# Patient Record
Sex: FEMALE | Race: WHITE | URBAN - METROPOLITAN AREA
[De-identification: names, ages, dates, MRNs, and addresses within clinical notes are randomized per-mention and may not be internally consistent; named-entity substitution may affect disease eponyms.]

---

## 2017-02-27 ENCOUNTER — TELEPHONE (OUTPATIENT)
Dept: FAMILY MEDICINE CLINIC | Facility: CLINIC | Age: 82
End: 2017-02-27

## 2017-02-27 RX ORDER — CEPHALEXIN 250 MG/1
1 CAPSULE ORAL DAILY
COMMUNITY
Start: 2016-08-10 | End: 2017-06-07

## 2017-02-27 RX ORDER — ALBUTEROL SULFATE 90 UG/1
2 AEROSOL, METERED RESPIRATORY (INHALATION) 4 TIMES DAILY PRN
COMMUNITY
Start: 2013-07-08 | End: 2018-12-28

## 2017-02-27 RX ORDER — LORAZEPAM 1 MG/1
1.5 TABLET ORAL 2 TIMES DAILY
Qty: 60 TABLET | Refills: 2 | Status: SHIPPED
Start: 2017-02-27 | End: 2017-02-27

## 2017-02-27 RX ORDER — LORAZEPAM 1 MG/1
TABLET ORAL
Qty: 60 TABLET | Refills: 2 | Status: SHIPPED
Start: 2017-02-27 | End: 2017-06-05

## 2017-02-27 RX ORDER — LORAZEPAM 1 MG/1
1.5 TABLET ORAL 2 TIMES DAILY
COMMUNITY
Start: 2011-04-28 | End: 2017-02-27

## 2017-02-27 RX ORDER — LISINOPRIL 10 MG/1
TABLET ORAL
COMMUNITY
Start: 2016-12-21 | End: 2017-06-07

## 2017-02-27 RX ORDER — OXYBUTYNIN CHLORIDE 5 MG/1
5 TABLET ORAL DAILY
COMMUNITY
Start: 2015-08-10

## 2017-02-27 RX ORDER — ATENOLOL 50 MG/1
1 TABLET ORAL 2 TIMES DAILY
COMMUNITY
Start: 2012-11-28 | End: 2017-08-16

## 2017-02-27 RX ORDER — SERTRALINE HYDROCHLORIDE 100 MG/1
0.5 TABLET, FILM COATED ORAL DAILY
COMMUNITY
Start: 2007-07-07 | End: 2017-03-06

## 2017-02-27 RX ORDER — ACETAMINOPHEN AND CODEINE PHOSPHATE 300; 30 MG/1; MG/1
TABLET ORAL
COMMUNITY
Start: 2016-12-21 | End: 2018-06-08 | Stop reason: ALTCHOICE

## 2017-02-27 RX ORDER — FERROUS SULFATE 325(65) MG
1 TABLET ORAL DAILY
COMMUNITY
Start: 2016-12-21 | End: 2018-06-08 | Stop reason: CLARIF

## 2017-02-27 RX ORDER — OMEPRAZOLE 20 MG/1
20 CAPSULE, DELAYED RELEASE ORAL DAILY
COMMUNITY
Start: 2007-03-14 | End: 2017-06-07

## 2017-02-27 RX ORDER — ATORVASTATIN CALCIUM 10 MG/1
10 TABLET, FILM COATED ORAL DAILY
COMMUNITY
Start: 2006-11-01 | End: 2017-10-15

## 2017-02-28 NOTE — TELEPHONE ENCOUNTER
Received c/b from patient stating she would like her scripts permanently sent to [facility], [place].  Irasema Brown, 02/28/2017, 2:06 PM

## 2017-03-03 ENCOUNTER — TELEPHONE (OUTPATIENT)
Dept: FAMILY MEDICINE CLINIC | Facility: CLINIC | Age: 82
End: 2017-03-03

## 2017-03-06 RX ORDER — SERTRALINE HYDROCHLORIDE 100 MG/1
50 TABLET, FILM COATED ORAL DAILY
Qty: 45 TABLET | Refills: 0 | Status: SHIPPED | OUTPATIENT
Start: 2017-03-06 | End: 2017-05-31

## 2017-03-06 NOTE — TELEPHONE ENCOUNTER
Pt looking for refill for Zoloft.  [facility]

## 2017-04-21 RX ORDER — NIFEDIPINE 60 MG/1
60 TABLET, FILM COATED, EXTENDED RELEASE ORAL DAILY
Qty: 90 TABLET | Refills: 0 | Status: CANCELLED | OUTPATIENT
Start: 2017-04-21

## 2017-04-21 RX ORDER — NIFEDIPINE 60 MG/1
TABLET, EXTENDED RELEASE ORAL
Qty: 90 TABLET | Refills: 1 | Status: SHIPPED | OUTPATIENT
Start: 2017-04-21 | End: 2017-10-15

## 2017-05-15 RX ORDER — LEVOTHYROXINE SODIUM 88 UG/1
TABLET ORAL
Qty: 90 TABLET | Refills: 0 | Status: SHIPPED | OUTPATIENT
Start: 2017-05-15 | End: 2017-08-16

## 2017-05-16 ENCOUNTER — TELEPHONE (OUTPATIENT)
Dept: FAMILY MEDICINE CLINIC | Facility: CLINIC | Age: 82
End: 2017-05-16

## 2017-05-31 RX ORDER — SERTRALINE HYDROCHLORIDE 100 MG/1
TABLET, FILM COATED ORAL
Qty: 45 TABLET | Refills: 0 | Status: SHIPPED | OUTPATIENT
Start: 2017-05-31 | End: 2017-06-07

## 2017-06-05 RX ORDER — LORAZEPAM 1 MG/1
1.5 TABLET ORAL 2 TIMES DAILY
Qty: 90 TABLET | Refills: 0 | Status: SHIPPED
Start: 2017-06-05 | End: 2017-07-17

## 2017-06-05 NOTE — TELEPHONE ENCOUNTER
The last RX that was sent in was only for 2 days. Dr Thony Pacheco can you please reorder medication for 30 days with refills. Thank you.

## 2017-06-05 NOTE — TELEPHONE ENCOUNTER
Future Appointments  Date Time Provider Salvatore Meléndez   6/7/2017 2:30 PM Luis Hogan MD [facility] [place]     Set up appt

## 2017-06-05 NOTE — TELEPHONE ENCOUNTER
Pharmacy called because the Alprazolam order as written only allows for a 20 day supply. Can you please resend the order as a 30 day supply?

## 2017-06-07 ENCOUNTER — OFFICE VISIT (OUTPATIENT)
Dept: FAMILY MEDICINE CLINIC | Facility: CLINIC | Age: 82
End: 2017-06-07

## 2017-06-07 VITALS
TEMPERATURE: 98 F | SYSTOLIC BLOOD PRESSURE: 148 MMHG | WEIGHT: 138 LBS | BODY MASS INDEX: 24.45 KG/M2 | DIASTOLIC BLOOD PRESSURE: 74 MMHG | HEIGHT: 63 IN | HEART RATE: 78 BPM | RESPIRATION RATE: 20 BRPM

## 2017-06-07 DIAGNOSIS — I10 ESSENTIAL HYPERTENSION: ICD-10-CM

## 2017-06-07 DIAGNOSIS — J44.9 CHRONIC OBSTRUCTIVE PULMONARY DISEASE, UNSPECIFIED COPD TYPE (HCC): ICD-10-CM

## 2017-06-07 DIAGNOSIS — Z00.00 HEALTH CARE MAINTENANCE: Primary | ICD-10-CM

## 2017-06-07 DIAGNOSIS — F17.200 TOBACCO USE DISORDER: ICD-10-CM

## 2017-06-07 DIAGNOSIS — M19.90 OSTEOARTHRITIS, UNSPECIFIED OSTEOARTHRITIS TYPE, UNSPECIFIED SITE: ICD-10-CM

## 2017-06-07 DIAGNOSIS — F41.1 ANXIETY STATE: ICD-10-CM

## 2017-06-07 DIAGNOSIS — E03.9 HYPOTHYROIDISM, UNSPECIFIED TYPE: ICD-10-CM

## 2017-06-07 PROCEDURE — 90670 PCV13 VACCINE IM: CPT | Performed by: FAMILY MEDICINE

## 2017-06-07 PROCEDURE — G0009 ADMIN PNEUMOCOCCAL VACCINE: HCPCS | Performed by: FAMILY MEDICINE

## 2017-06-07 PROCEDURE — 99214 OFFICE O/P EST MOD 30 MIN: CPT | Performed by: FAMILY MEDICINE

## 2017-06-07 PROCEDURE — G0439 PPPS, SUBSEQ VISIT: HCPCS | Performed by: FAMILY MEDICINE

## 2017-06-07 RX ORDER — CILOSTAZOL 100 MG/1
100 TABLET ORAL 2 TIMES DAILY
COMMUNITY
End: 2017-09-25

## 2017-06-08 NOTE — PROGRESS NOTES
[facility] [place]  PROGRESS NOTE  Chief Complaint:   Patient presents with:  Physical      HPI:   This is a 80year old female coming in for wellness check and recheck of multiple problems.   Osteoarthritis: Had right knee replaced last December reaction(s): rash  Levofloxacin                Comment:Other reaction(s): possibly caused severe vomiting  Lotrel                    Phenazopyridine           Sulfa Antibiotics         Current Meds:    Current Outpatient Prescriptions:  cilostazol 100 MG O blood in stool. MUSCULOSKELETAL: See HPI  NEUROLOGICAL:  Denies headache, seizures, dizziness, syncope, paralysis, ataxia, numbness or tingling in the extremities,change in bowel or bladder control. HEMATOLOGIC:  Denies anemia, bleeding or bruising.   LYM hypertension    Osteoarthritis, unspecified osteoarthritis type, unspecified site    Other orders  -     Pneumococcal (Prevnar 13) (55828) (DX V03.82/Z23)        PLAN: Had labs in December for preop.   Will return in October for routine labs including rachana

## 2017-06-08 NOTE — PATIENT INSTRUCTIONS
Continue with same medications. Prevnar vaccine for pneumonia given today. Return in fall for fasting labs.

## 2017-07-17 NOTE — TELEPHONE ENCOUNTER
Refill for the lazazepam faxed to [facility]. Last offiice visit 6/7/17. OTW for Wednesday.

## 2017-07-19 RX ORDER — LORAZEPAM 1 MG/1
1.5 TABLET ORAL 2 TIMES DAILY
Qty: 90 TABLET | Refills: 0 | Status: SHIPPED
Start: 2017-07-19 | End: 2017-09-28

## 2017-08-16 RX ORDER — ATENOLOL 25 MG/1
TABLET ORAL
Qty: 120 TABLET | Refills: 0 | OUTPATIENT
Start: 2017-08-16

## 2017-08-16 RX ORDER — LEVOTHYROXINE SODIUM 88 UG/1
TABLET ORAL
Qty: 90 TABLET | Refills: 1 | Status: SHIPPED | OUTPATIENT
Start: 2017-08-16 | End: 2018-02-19

## 2017-08-16 RX ORDER — METOPROLOL SUCCINATE 50 MG/1
50 TABLET, EXTENDED RELEASE ORAL 2 TIMES DAILY
Qty: 60 TABLET | Refills: 6 | Status: SHIPPED | OUTPATIENT
Start: 2017-08-16 | End: 2018-03-20

## 2017-08-16 NOTE — TELEPHONE ENCOUNTER
Patient renewed. Atenolol has been changed to metoprolol due to shortage. She will be taking 50 mg of metoprolol twice daily. Please notify patient.

## 2017-08-16 NOTE — TELEPHONE ENCOUNTER
Last OV: 6/7/2017  Future Appointments  Date Time Provider Salvatore Rika   10/9/2017 8:15 AM [facility] [facility]     Christiane Tello, 08/16/17, 7:15 AM

## 2017-08-17 NOTE — TELEPHONE ENCOUNTER
Informed pt that refill request was sent to pharmacy but atenolol was changed to metorpolol and to watch the dose and to read the label very carefully. Pt will be getting stronger dose. Pt expressed understanding.

## 2017-09-01 RX ORDER — SERTRALINE HYDROCHLORIDE 100 MG/1
TABLET, FILM COATED ORAL
Qty: 45 TABLET | Refills: 3 | Status: SHIPPED | OUTPATIENT
Start: 2017-09-01 | End: 2018-10-31

## 2017-09-01 RX ORDER — FLUTICASONE PROPIONATE 220 UG/1
AEROSOL, METERED RESPIRATORY (INHALATION)
Qty: 1 INHALER | Refills: 12 | Status: SHIPPED | OUTPATIENT
Start: 2017-09-01 | End: 2018-12-26

## 2017-09-01 NOTE — TELEPHONE ENCOUNTER
Last OV:  6/7/2017  Future Appointments  Date Time Provider Salvatore Rika   10/9/2017 8:15 AM RE[facility] [facility]     Christiane Tello, 09/01/17, 7:10 AM

## 2017-09-01 NOTE — TELEPHONE ENCOUNTER
Last OV:  6/7/2017  Future Appointments  Date Time Provider Salvatore Rika   10/9/2017 8:15 AM RE[facility] [facility]     Christiane Tello, 09/01/17, 7:11 AM

## 2017-09-25 NOTE — TELEPHONE ENCOUNTER
Pt was put on Cilostazol 100mg bid by Dr. Allie Mendoza. Dr. Allie Mendoza has moved to [address] and pt would do not want to go to [address]. Will you refill this med for the pt. OK for Wednesday.

## 2017-09-25 NOTE — TELEPHONE ENCOUNTER
would like Dr. Shamar Porter to fill her Rx   -  generic Plavix  100mg 1 BID   #170 -  Call into [facility]

## 2017-09-26 RX ORDER — CILOSTAZOL 100 MG/1
100 TABLET ORAL 2 TIMES DAILY
Qty: 180 TABLET | Refills: 1 | Status: SHIPPED | OUTPATIENT
Start: 2017-09-26 | End: 2018-03-22

## 2017-09-29 RX ORDER — LORAZEPAM 1 MG/1
TABLET ORAL
Qty: 90 TABLET | Refills: 3 | Status: SHIPPED
Start: 2017-09-29 | End: 2018-01-17

## 2017-09-29 NOTE — TELEPHONE ENCOUNTER
Future appt:     Your appointments     Date & Time Appointment Department [facility])    Oct 09, 2017  8:15 AM CDT Laboratory Visit with REF [facility] ([facility])        [facility]  [facility] [place]  [address] [place] [address]

## 2017-10-04 ENCOUNTER — TELEPHONE (OUTPATIENT)
Dept: FAMILY MEDICINE CLINIC | Facility: CLINIC | Age: 82
End: 2017-10-04

## 2017-10-04 DIAGNOSIS — I10 ESSENTIAL HYPERTENSION: Primary | ICD-10-CM

## 2017-10-04 DIAGNOSIS — E78.5 HYPERLIPIDEMIA, UNSPECIFIED HYPERLIPIDEMIA TYPE: ICD-10-CM

## 2017-10-04 DIAGNOSIS — E03.9 HYPOTHYROIDISM, UNSPECIFIED TYPE: ICD-10-CM

## 2017-10-09 ENCOUNTER — LABORATORY ENCOUNTER (OUTPATIENT)
Dept: LAB | Age: 82
End: 2017-10-09
Attending: FAMILY MEDICINE
Payer: MEDICARE

## 2017-10-09 DIAGNOSIS — I10 ESSENTIAL HYPERTENSION: ICD-10-CM

## 2017-10-09 DIAGNOSIS — E78.5 HYPERLIPIDEMIA, UNSPECIFIED HYPERLIPIDEMIA TYPE: ICD-10-CM

## 2017-10-09 DIAGNOSIS — E03.9 HYPOTHYROIDISM, UNSPECIFIED TYPE: ICD-10-CM

## 2017-10-09 PROCEDURE — 80053 COMPREHEN METABOLIC PANEL: CPT

## 2017-10-09 PROCEDURE — 85025 COMPLETE CBC W/AUTO DIFF WBC: CPT

## 2017-10-09 PROCEDURE — 80061 LIPID PANEL: CPT

## 2017-10-09 PROCEDURE — 84443 ASSAY THYROID STIM HORMONE: CPT

## 2017-10-09 PROCEDURE — 36415 COLL VENOUS BLD VENIPUNCTURE: CPT

## 2017-10-09 PROCEDURE — 84439 ASSAY OF FREE THYROXINE: CPT

## 2017-10-10 ENCOUNTER — TELEPHONE (OUTPATIENT)
Dept: FAMILY MEDICINE CLINIC | Facility: CLINIC | Age: 82
End: 2017-10-10

## 2017-10-10 NOTE — TELEPHONE ENCOUNTER
----- Message from Meme Landaverde MD sent at 10/10/2017  7:42 AM CDT -----  Labs are normal with exception of a slightly low potassium at 3.4. Patient does not take any medications which would normally lower potassium.   I would like patient to increase

## 2017-10-16 RX ORDER — NIFEDIPINE 60 MG/1
TABLET, EXTENDED RELEASE ORAL
Qty: 90 TABLET | Refills: 3 | Status: SHIPPED | OUTPATIENT
Start: 2017-10-16 | End: 2018-10-12

## 2017-10-16 RX ORDER — ATORVASTATIN CALCIUM 10 MG/1
TABLET, FILM COATED ORAL
Qty: 90 TABLET | Refills: 3 | Status: SHIPPED | OUTPATIENT
Start: 2017-10-16 | End: 2018-10-31

## 2017-10-16 NOTE — TELEPHONE ENCOUNTER
Future appt:  None   Last Appointment:  6/7/2017; Return in about 1 year (around 6/7/2018).      Cholesterol, Total (mg/dL)   Date Value   10/09/2017 151   ----------  HDL Cholesterol (mg/dL)   Date Value   10/09/2017 48   ----------  LDL Cholesterol (mg/dL

## 2018-01-17 RX ORDER — LORAZEPAM 1 MG/1
TABLET ORAL
Qty: 90 TABLET | Refills: 3 | Status: SHIPPED
Start: 2018-01-17 | End: 2018-05-10

## 2018-01-23 ENCOUNTER — TELEPHONE (OUTPATIENT)
Dept: FAMILY MEDICINE CLINIC | Facility: CLINIC | Age: 83
End: 2018-01-23

## 2018-01-23 NOTE — TELEPHONE ENCOUNTER
Script given 1/17/2018 but looks like it went to [facility]. Spoke with Estee who states they did receive the prescription and patient has already picked it up. Spoke with Huy Bay at [address] [place] [address] and informed her of the above.

## 2018-02-19 RX ORDER — LEVOTHYROXINE SODIUM 88 UG/1
88 TABLET ORAL
Qty: 90 TABLET | Refills: 1 | Status: SHIPPED | OUTPATIENT
Start: 2018-02-19 | End: 2018-08-04

## 2018-02-19 NOTE — TELEPHONE ENCOUNTER
Future appt:    Last Appointment:  Visit date not found    Cholesterol, Total (mg/dL)   Date Value   10/09/2017 151   ----------  HDL Cholesterol (mg/dL)   Date Value   10/09/2017 48   ----------  LDL Cholesterol (mg/dL)   Date Value   10/09/2017 74   ----

## 2018-02-19 NOTE — TELEPHONE ENCOUNTER
Future appt:    Last Appointment:  6/7/17   Return 1 Year.     Cholesterol, Total (mg/dL)   Date Value   10/09/2017 151   ----------  HDL Cholesterol (mg/dL)   Date Value   10/09/2017 48   ----------  LDL Cholesterol (mg/dL)   Date Value   10/09/2017 74   -

## 2018-03-20 RX ORDER — METOPROLOL SUCCINATE 50 MG/1
TABLET, EXTENDED RELEASE ORAL
Qty: 180 TABLET | Refills: 1 | Status: SHIPPED | OUTPATIENT
Start: 2018-03-20 | End: 2018-06-20 | Stop reason: ALTCHOICE

## 2018-03-20 NOTE — TELEPHONE ENCOUNTER
Future appt:  None   Last Appointment:  6/7/2017; Return in about 1 year (around 6/7/2018).        Cholesterol, Total (mg/dL)   Date Value   10/09/2017 151   ----------  HDL Cholesterol (mg/dL)   Date Value   10/09/2017 48   ----------  LDL Cholesterol (mg/

## 2018-03-22 ENCOUNTER — TELEPHONE (OUTPATIENT)
Dept: FAMILY MEDICINE CLINIC | Facility: CLINIC | Age: 83
End: 2018-03-22

## 2018-03-22 RX ORDER — CILOSTAZOL 100 MG/1
100 TABLET ORAL 2 TIMES DAILY
Qty: 180 TABLET | Refills: 1 | Status: SHIPPED | OUTPATIENT
Start: 2018-03-22 | End: 2018-09-14

## 2018-04-02 ENCOUNTER — TELEPHONE (OUTPATIENT)
Dept: FAMILY MEDICINE CLINIC | Facility: CLINIC | Age: 83
End: 2018-04-02

## 2018-04-02 DIAGNOSIS — E87.6 HYPOKALEMIA: Primary | ICD-10-CM

## 2018-04-03 NOTE — TELEPHONE ENCOUNTER
Tell patient that she had normal labs in the fall with the exception of a slightly low potassium. Told her when she comes in for physical I will repeat her potassium level but does not need to be fasting.   We can do the blood tests at the time of the phys

## 2018-05-10 RX ORDER — LORAZEPAM 1 MG/1
TABLET ORAL
Qty: 30 TABLET | Refills: 0 | Status: SHIPPED | OUTPATIENT
Start: 2018-05-10 | End: 2018-05-14

## 2018-05-10 NOTE — TELEPHONE ENCOUNTER
[facility] prescription monitoring program reviewed. Refill given for #30 with no refills as Dr. Miguel Krishnan is out of the office. He will be back in the office on Monday. Please fax prescription.

## 2018-05-14 RX ORDER — LORAZEPAM 1 MG/1
TABLET ORAL
Qty: 90 TABLET | Refills: 0 | Status: SHIPPED
Start: 2018-05-14 | End: 2018-06-14

## 2018-05-14 NOTE — TELEPHONE ENCOUNTER
Future appt:     Your appointments     Date & Time Appointment Department [facility])    Jun 08, 2018 11:00 AM CDT Medicare Annual Well Visit with Pedro Sheikh MD 25 [facility], Carol Duke ([facility])        Payam Baez

## 2018-06-08 ENCOUNTER — APPOINTMENT (OUTPATIENT)
Dept: LAB | Age: 83
End: 2018-06-08
Attending: FAMILY MEDICINE
Payer: MEDICARE

## 2018-06-08 ENCOUNTER — OFFICE VISIT (OUTPATIENT)
Dept: FAMILY MEDICINE CLINIC | Facility: CLINIC | Age: 83
End: 2018-06-08

## 2018-06-08 VITALS
RESPIRATION RATE: 16 BRPM | TEMPERATURE: 97 F | HEART RATE: 68 BPM | SYSTOLIC BLOOD PRESSURE: 144 MMHG | DIASTOLIC BLOOD PRESSURE: 70 MMHG | WEIGHT: 146.5 LBS | BODY MASS INDEX: 25.96 KG/M2 | HEIGHT: 63 IN

## 2018-06-08 DIAGNOSIS — M19.90 OSTEOARTHRITIS, UNSPECIFIED OSTEOARTHRITIS TYPE, UNSPECIFIED SITE: ICD-10-CM

## 2018-06-08 DIAGNOSIS — Z00.00 HEALTH CARE MAINTENANCE: Primary | ICD-10-CM

## 2018-06-08 DIAGNOSIS — I10 ESSENTIAL HYPERTENSION: ICD-10-CM

## 2018-06-08 DIAGNOSIS — E87.6 HYPOKALEMIA: ICD-10-CM

## 2018-06-08 DIAGNOSIS — E03.9 HYPOTHYROIDISM, UNSPECIFIED TYPE: ICD-10-CM

## 2018-06-08 DIAGNOSIS — E78.5 HYPERLIPIDEMIA, UNSPECIFIED HYPERLIPIDEMIA TYPE: ICD-10-CM

## 2018-06-08 DIAGNOSIS — F17.200 TOBACCO USE DISORDER: ICD-10-CM

## 2018-06-08 DIAGNOSIS — I73.9 PERIPHERAL VASCULAR DISEASE (HCC): ICD-10-CM

## 2018-06-08 DIAGNOSIS — F41.1 ANXIETY STATE: ICD-10-CM

## 2018-06-08 PROCEDURE — 36415 COLL VENOUS BLD VENIPUNCTURE: CPT | Performed by: FAMILY MEDICINE

## 2018-06-08 PROCEDURE — 99214 OFFICE O/P EST MOD 30 MIN: CPT | Performed by: FAMILY MEDICINE

## 2018-06-08 PROCEDURE — 80048 BASIC METABOLIC PNL TOTAL CA: CPT | Performed by: FAMILY MEDICINE

## 2018-06-08 NOTE — PATIENT INSTRUCTIONS
Continue same medication. Continue to work on cessation of smoking. Recheck in November. Will do fasting labs at that time.

## 2018-06-08 NOTE — PROGRESS NOTES
[facility] [place]  PROGRESS NOTE  Chief Complaint:   Patient presents with:  Physical      HPI:   This is a 80year old female coming in for wellness and recheck of problems of. Patient's blood pressures under good control with medication.   Sh 43.3 34.0 - 50.0 %   .0 150.0 - 450.0 10(3)uL   MCV 87.1 81.0 - 100.0 fL   MCH 28.6 27.0 - 33.2 pg   MCHC 32.8 31.0 - 37.0 g/dL   RDW 14.4 11.5 - 16.0 %   RDW-SD 45.8 35.1 - 46.3 fL   Neutrophil Absolute Prelim 5.13 1.30 - 6.70 x10 (3) uL   Neutroph Parkinson's   • Breast Cancer Sister      Allergies:    Ciprofloxacin               Comment:Other reaction(s): arm stiffness  Codeine                     Comment:Other reaction(s): rash  Levofloxacin                Comment:Other reaction(s): possibly cause skin dryness.   CARDIOVASCULAR:  Denies chest pain, chest pressure, chest discomfort, palpitations, edema, dyspnea on exertion or at rest.  RESPIRATORY: See HPI  GASTROINTESTINAL:  Denies abdominal pain, nausea, vomiting, constipation, diarrhea, or blood in potassium. Up-to-date on pneumonia vaccines. Patient declines mammogram.  Recheck in 6 months and will be due full labs at that time. Osteoarthritis: Parking placard form filled out. Continue to follow-up with orthopedic surgeon.   Hypertension: Control

## 2018-06-09 ENCOUNTER — TELEPHONE (OUTPATIENT)
Dept: FAMILY MEDICINE CLINIC | Facility: CLINIC | Age: 83
End: 2018-06-09

## 2018-06-09 NOTE — TELEPHONE ENCOUNTER
Informed pt of her blood work results. Advised pt to eat 3 meals per day with snacks. Pt already informed me that she does not eat breakfast.    I encouraged her to eat breakfast with protein (egg etc.)    Pt expressed understanding and thanks.

## 2018-06-09 NOTE — TELEPHONE ENCOUNTER
----- Message from Bayron Kimball MD sent at 6/9/2018  8:01 AM CDT -----  Dr. Rand Rodriguez patient, please inform patient that her potassium level is normal, but her glucose level is slightly low, make sure she is eating at least 3 meals with snacks in between

## 2018-06-14 NOTE — TELEPHONE ENCOUNTER
Future appt:    Last Appointment:  6/8/2018    Cholesterol, Total (mg/dL)   Date Value   10/09/2017 151   ----------  HDL Cholesterol (mg/dL)   Date Value   10/09/2017 48   ----------  LDL Cholesterol (mg/dL)   Date Value   10/09/2017 74   ----------  Triglycerides (mg/dL)   Date Value   10/09/2017 147   ----------  No results found for: EAG, A1C    Lab Results  Component Value Date   T4F 1.2 10/09/2017   TSH 4.660 10/09/2017       No Follow-up on file. No future appointments.

## 2018-06-15 ENCOUNTER — TELEPHONE (OUTPATIENT)
Dept: FAMILY MEDICINE CLINIC | Facility: CLINIC | Age: 83
End: 2018-06-15

## 2018-06-15 RX ORDER — ATENOLOL 50 MG/1
TABLET ORAL
Qty: 60 TABLET | Refills: 0 | Status: SHIPPED | OUTPATIENT
Start: 2018-06-15 | End: 2018-06-20 | Stop reason: ALTCHOICE

## 2018-06-15 RX ORDER — LORAZEPAM 1 MG/1
TABLET ORAL
Qty: 90 TABLET | Refills: 0 | Status: SHIPPED
Start: 2018-06-15 | End: 2018-07-13

## 2018-06-15 NOTE — TELEPHONE ENCOUNTER
Pt states she has not been feeling well on Metoprolol. Pt recently seen on 6/8 and had labs drawn. Pt BP today: 134/70. Discussed with CS- f/u appt advised next week. Pt urged to call sooner if needed. Call transferred to appt desk to get scheduled.

## 2018-06-15 NOTE — TELEPHONE ENCOUNTER
having problems with high BP, low BS, low Protein, & low Potassium. Wondering if it is due to the Metoprolol. Wondering about switching back to Atenolol. Has 6 days worth of Metoprolol left.

## 2018-06-15 NOTE — TELEPHONE ENCOUNTER
Future appt:    Last Appointment:  6/8/2018    Cholesterol, Total (mg/dL)   Date Value   10/09/2017 151   ----------  HDL Cholesterol (mg/dL)   Date Value   10/09/2017 48   ----------  LDL Cholesterol (mg/dL)   Date Value   10/09/2017 74   ----------  Trig

## 2018-06-20 ENCOUNTER — OFFICE VISIT (OUTPATIENT)
Dept: FAMILY MEDICINE CLINIC | Facility: CLINIC | Age: 83
End: 2018-06-20

## 2018-06-20 VITALS
SYSTOLIC BLOOD PRESSURE: 128 MMHG | HEIGHT: 63 IN | HEART RATE: 78 BPM | WEIGHT: 144.81 LBS | DIASTOLIC BLOOD PRESSURE: 70 MMHG | TEMPERATURE: 98 F | RESPIRATION RATE: 16 BRPM | BODY MASS INDEX: 25.66 KG/M2

## 2018-06-20 DIAGNOSIS — R53.83 FATIGUE, UNSPECIFIED TYPE: ICD-10-CM

## 2018-06-20 DIAGNOSIS — I10 ESSENTIAL HYPERTENSION: Primary | ICD-10-CM

## 2018-06-20 DIAGNOSIS — J44.9 CHRONIC OBSTRUCTIVE PULMONARY DISEASE, UNSPECIFIED COPD TYPE (HCC): ICD-10-CM

## 2018-06-20 PROCEDURE — 99214 OFFICE O/P EST MOD 30 MIN: CPT | Performed by: FAMILY MEDICINE

## 2018-06-20 RX ORDER — LOSARTAN POTASSIUM 50 MG/1
50 TABLET ORAL DAILY
Qty: 30 TABLET | Refills: 0 | Status: SHIPPED | OUTPATIENT
Start: 2018-06-20 | End: 2018-07-17

## 2018-06-20 NOTE — PROGRESS NOTES
[facility] [place]  PROGRESS NOTE  Chief Complaint:   Patient presents with:  Medication Request: requesting to switch BP meds      HPI:   This is a 80year old female coming in for review of blood pressure medication and fatigue.   Patient state Comment: Partial  Social History:  Smoking status: Current Every Day Smoker                                                   Packs/day: 1.00      Years: 0.00      Smokeless tobacco: Never Used                      Alcohol use:  No              Family His Answered  Counseling given: Not Answered       REVIEW OF SYSTEMS:   CONSTITUTIONAL:  Denies unusual weight gain/loss  EENT:  Eyes:  Denies eye pain, visual loss, blurred vision, double vision or yellow sclerae.  Ears, Nose, Throat:  Denies hearing loss, sne ([facility] [address]    Fatigue, unspecified type    Other orders  -     Losartan Potassium 50 MG Oral Tab; Take 1 tablet (50 mg total) by mouth daily. PLAN: #1 hypertension: We will change medication. Stop beta-blocker. Begin losartan 50 mg daily.   Continue wit

## 2018-07-13 RX ORDER — LORAZEPAM 1 MG/1
TABLET ORAL
Qty: 90 TABLET | Refills: 0 | Status: SHIPPED
Start: 2018-07-13 | End: 2018-08-10

## 2018-07-13 NOTE — TELEPHONE ENCOUNTER
Future appt:     Your appointments     Date & Time Appointment Department [facility])    Jul 18, 2018  9:00 AM CDT Follow up with Rolando Mcgee MD [address] [place], [place] [address]way, [place] ([facility])        Tommy Lockett, Kelle Saez

## 2018-07-17 NOTE — TELEPHONE ENCOUNTER
Future appt:     Your appointments     Date & Time Appointment Department [facility])    Jul 18, 2018  9:00 AM CDT Follow up with Radha Harris MD 25 [facility], [facility] ([place])        Tommy [address], [place] Puri

## 2018-07-18 ENCOUNTER — OFFICE VISIT (OUTPATIENT)
Dept: FAMILY MEDICINE CLINIC | Facility: CLINIC | Age: 83
End: 2018-07-18
Payer: MEDICARE

## 2018-07-18 VITALS
SYSTOLIC BLOOD PRESSURE: 162 MMHG | TEMPERATURE: 97 F | BODY MASS INDEX: 25 KG/M2 | DIASTOLIC BLOOD PRESSURE: 82 MMHG | HEART RATE: 80 BPM | WEIGHT: 142 LBS

## 2018-07-18 DIAGNOSIS — J44.9 CHRONIC OBSTRUCTIVE PULMONARY DISEASE, UNSPECIFIED COPD TYPE (HCC): ICD-10-CM

## 2018-07-18 DIAGNOSIS — I10 ESSENTIAL HYPERTENSION: Primary | ICD-10-CM

## 2018-07-18 DIAGNOSIS — R53.83 FATIGUE, UNSPECIFIED TYPE: ICD-10-CM

## 2018-07-18 DIAGNOSIS — F17.200 TOBACCO USE DISORDER: ICD-10-CM

## 2018-07-18 PROCEDURE — 99214 OFFICE O/P EST MOD 30 MIN: CPT | Performed by: FAMILY MEDICINE

## 2018-07-18 RX ORDER — LOSARTAN POTASSIUM 100 MG/1
100 TABLET ORAL DAILY
Qty: 90 TABLET | Refills: 1 | Status: SHIPPED | OUTPATIENT
Start: 2018-07-18 | End: 2018-08-17

## 2018-07-18 RX ORDER — LOSARTAN POTASSIUM 50 MG/1
TABLET ORAL
Qty: 90 TABLET | Refills: 1 | Status: SHIPPED | OUTPATIENT
Start: 2018-07-18 | End: 2018-07-18

## 2018-07-18 NOTE — PATIENT INSTRUCTIONS
Increase losartan up to 100 mg daily. Continue with amlodipine. Recheck in 4 weeks. Continue to work on smoking cessation.

## 2018-07-18 NOTE — PROGRESS NOTES
[place] [facility] [place]  PROGRESS NOTE  Chief Complaint:   Patient presents with:  Medication Follow-Up      HPI:   This is a 80year old female coming in for recheck of hypertension and problems.   Last month patient complained of generalized fatigue History:  Smoking status: Current Every Day Smoker                                                   Packs/day: 1.00      Years: 0.00      Smokeless tobacco: Never Used                      Alcohol use:  No              Family History:  Family History   Pro Answered       REVIEW OF SYSTEMS:   CONSTITUTIONAL:  Denies unusual weight gain/loss  EENT:  Eyes:  Denies eye pain, visual loss, blurred vision, double vision or yellow sclerae.  Ears, Nose, Throat:  Denies hearing loss, sneezing, congestion, runny nose or losartan 100 MG Oral Tab; Take 1 tablet (100 mg total) by mouth daily. PLAN: #1 hypertension: Continues to need better control. Will increase losartan up to 100 mg daily. Recheck in 4 weeks.   #2 generalized fatigue: Most likely not related to the

## 2018-08-04 NOTE — TELEPHONE ENCOUNTER
Future appt:     Your appointments     Date & Time Appointment Department [facility])    Aug 15, 2018  9:30 AM CDT Follow up with Myrtle Vizcarra MD 25 [facility], [facility] ([place])        Tommy [address], [place] Tello

## 2018-08-06 RX ORDER — LEVOTHYROXINE SODIUM 88 UG/1
TABLET ORAL
Qty: 90 TABLET | Refills: 1 | Status: SHIPPED | OUTPATIENT
Start: 2018-08-06 | End: 2019-01-24

## 2018-08-10 RX ORDER — LORAZEPAM 1 MG/1
TABLET ORAL
Qty: 90 TABLET | Refills: 4 | Status: SHIPPED
Start: 2018-08-10 | End: 2018-12-26

## 2018-08-10 NOTE — TELEPHONE ENCOUNTER
Future appt:     Your appointments     Date & Time Appointment Department [facility])    Aug 15, 2018  9:30 AM CDT Follow up with Karime Gomez MD 25 [facility] [place] ([facility]        Tommy [address], [address].

## 2018-08-15 ENCOUNTER — APPOINTMENT (OUTPATIENT)
Dept: LAB | Age: 83
End: 2018-08-15
Attending: FAMILY MEDICINE
Payer: MEDICARE

## 2018-08-15 ENCOUNTER — OFFICE VISIT (OUTPATIENT)
Dept: FAMILY MEDICINE CLINIC | Facility: CLINIC | Age: 83
End: 2018-08-15
Payer: MEDICARE

## 2018-08-15 VITALS
HEART RATE: 96 BPM | SYSTOLIC BLOOD PRESSURE: 136 MMHG | BODY MASS INDEX: 24.87 KG/M2 | HEIGHT: 63 IN | WEIGHT: 140.38 LBS | DIASTOLIC BLOOD PRESSURE: 70 MMHG | RESPIRATION RATE: 18 BRPM | TEMPERATURE: 97 F

## 2018-08-15 DIAGNOSIS — R53.83 FATIGUE, UNSPECIFIED TYPE: Primary | ICD-10-CM

## 2018-08-15 DIAGNOSIS — I10 ESSENTIAL HYPERTENSION: ICD-10-CM

## 2018-08-15 DIAGNOSIS — I73.9 CLAUDICATION (HCC): ICD-10-CM

## 2018-08-15 DIAGNOSIS — E03.9 HYPOTHYROIDISM, UNSPECIFIED TYPE: ICD-10-CM

## 2018-08-15 LAB
ALBUMIN SERPL-MCNC: 3.5 G/DL (ref 3.5–4.8)
ALBUMIN/GLOB SERPL: 0.9 {RATIO} (ref 1–2)
ALP LIVER SERPL-CCNC: 133 U/L (ref 55–142)
ALT SERPL-CCNC: 15 U/L (ref 14–54)
ANION GAP SERPL CALC-SCNC: 7 MMOL/L (ref 0–18)
AST SERPL-CCNC: 19 U/L (ref 15–41)
BASOPHILS # BLD AUTO: 0.04 X10(3) UL (ref 0–0.1)
BASOPHILS NFR BLD AUTO: 0.6 %
BILIRUB SERPL-MCNC: 0.4 MG/DL (ref 0.1–2)
BUN BLD-MCNC: 14 MG/DL (ref 8–20)
BUN/CREAT SERPL: 15.9 (ref 10–20)
CALCIUM BLD-MCNC: 9.5 MG/DL (ref 8.3–10.3)
CHLORIDE SERPL-SCNC: 104 MMOL/L (ref 101–111)
CO2 SERPL-SCNC: 26 MMOL/L (ref 22–32)
CREAT BLD-MCNC: 0.88 MG/DL (ref 0.55–1.02)
EOSINOPHIL # BLD AUTO: 0.16 X10(3) UL (ref 0–0.3)
EOSINOPHIL NFR BLD AUTO: 2.4 %
ERYTHROCYTE [DISTWIDTH] IN BLOOD BY AUTOMATED COUNT: 13.4 % (ref 11.5–16)
GLOBULIN PLAS-MCNC: 4.1 G/DL (ref 2.5–3.7)
GLUCOSE BLD-MCNC: 102 MG/DL (ref 70–99)
HCT VFR BLD AUTO: 44.6 % (ref 34–50)
HGB BLD-MCNC: 14.7 G/DL (ref 12–16)
IMMATURE GRANULOCYTE COUNT: 0.02 X10(3) UL (ref 0–1)
IMMATURE GRANULOCYTE RATIO %: 0.3 %
LYMPHOCYTES # BLD AUTO: 1.27 X10(3) UL (ref 0.9–4)
LYMPHOCYTES NFR BLD AUTO: 18.7 %
M PROTEIN MFR SERPL ELPH: 7.6 G/DL (ref 6.1–8.3)
MCH RBC QN AUTO: 28.8 PG (ref 27–33.2)
MCHC RBC AUTO-ENTMCNC: 33 G/DL (ref 31–37)
MCV RBC AUTO: 87.3 FL (ref 81–100)
MONOCYTES # BLD AUTO: 0.63 X10(3) UL (ref 0.1–1)
MONOCYTES NFR BLD AUTO: 9.3 %
NEUTROPHIL ABS PRELIM: 4.67 X10 (3) UL (ref 1.3–6.7)
NEUTROPHILS # BLD AUTO: 4.67 X10(3) UL (ref 1.3–6.7)
NEUTROPHILS NFR BLD AUTO: 68.7 %
OSMOLALITY SERPL CALC.SUM OF ELEC: 285 MOSM/KG (ref 275–295)
PLATELET # BLD AUTO: 285 10(3)UL (ref 150–450)
POTASSIUM SERPL-SCNC: 3.9 MMOL/L (ref 3.6–5.1)
RBC # BLD AUTO: 5.11 X10(6)UL (ref 3.8–5.1)
RED CELL DISTRIBUTION WIDTH-SD: 42.7 FL (ref 35.1–46.3)
SODIUM SERPL-SCNC: 137 MMOL/L (ref 136–144)
TSI SER-ACNC: 6.64 MIU/ML (ref 0.35–5.5)
WBC # BLD AUTO: 6.8 X10(3) UL (ref 4–13)

## 2018-08-15 PROCEDURE — 80053 COMPREHEN METABOLIC PANEL: CPT | Performed by: FAMILY MEDICINE

## 2018-08-15 PROCEDURE — 84439 ASSAY OF FREE THYROXINE: CPT | Performed by: FAMILY MEDICINE

## 2018-08-15 PROCEDURE — 85025 COMPLETE CBC W/AUTO DIFF WBC: CPT | Performed by: FAMILY MEDICINE

## 2018-08-15 PROCEDURE — 84443 ASSAY THYROID STIM HORMONE: CPT | Performed by: FAMILY MEDICINE

## 2018-08-15 PROCEDURE — 99214 OFFICE O/P EST MOD 30 MIN: CPT | Performed by: FAMILY MEDICINE

## 2018-08-15 PROCEDURE — 36415 COLL VENOUS BLD VENIPUNCTURE: CPT | Performed by: FAMILY MEDICINE

## 2018-08-15 NOTE — PROGRESS NOTES
[place] [facility] [place]  PROGRESS NOTE  Chief Complaint:   Patient presents with:  Medication Follow-Up: increased fatigue      HPI:   This is a 80year old female coming in for recheck of hypertension.   On last visit her losartan was increased from date:  APPENDECTOMY  No date: BSO, OMENTECTOMY W/AIDEN      Comment: AIDEN and BSO at age 45s, cervix present  No date: THYROIDECTOMY      Comment: Partial  Social History:  Smoking status: Current Every Day Smoker daily as needed. Disp:  Rfl:    Oxybutynin Chloride 5 MG Oral Tab Take 5 mg by mouth daily.    Disp:  Rfl:       Ready to quit: Not Answered  Counseling given: Not Answered       REVIEW OF SYSTEMS:   CONSTITUTIONAL:  Denies unusual weight gain/loss  EENT: Decreased pulses in both lower legs. ASSESSMENT AND PLAN:   Misty Hastings was seen today for medication follow-up. Diagnoses and all orders for this visit:    Fatigue, unspecified type  -     COMP METABOLIC PANEL (14);  Future  -     CBC WITH DIFFERENTIAL WITH Pure hypercholesterolemia     Essential hypertension     Hypothyroidism     Peripheral vascular disease ([facility] [address]     Tobacco use disorder     Malignant neoplasm of thyroid gland ([facility] [address]      Juan Antonio Mckeon MD  8/15/2018  1:08 PM  [address]

## 2018-08-15 NOTE — PATIENT INSTRUCTIONS
Continue with same medications but decrease losartan down to 50 mg daily. Labs pending.   Will need to see Dr. Kalyn Jasso again

## 2018-08-16 LAB — T4 FREE SERPL-MCNC: 1.2 NG/DL (ref 0.9–1.8)

## 2018-08-17 ENCOUNTER — TELEPHONE (OUTPATIENT)
Dept: FAMILY MEDICINE CLINIC | Facility: CLINIC | Age: 83
End: 2018-08-17

## 2018-08-17 RX ORDER — LOSARTAN POTASSIUM 100 MG/1
50 TABLET ORAL DAILY
Qty: 90 TABLET | Refills: 1 | Status: SHIPPED | OUTPATIENT
Start: 2018-08-17 | End: 2019-01-01

## 2018-08-17 NOTE — TELEPHONE ENCOUNTER
----- Message from Zia Solitario MD sent at 8/16/2018  4:20 PM CDT -----  Labs are all normal.  Plan: #1. Patient will be decreasing her losartan from 100 mg down to 50 mg daily. 2.  I would like her to see Dr. Jesse Loaiza again for her leg pain.   Please no

## 2018-09-14 RX ORDER — CILOSTAZOL 100 MG/1
100 TABLET ORAL 2 TIMES DAILY
Qty: 180 TABLET | Refills: 1 | Status: SHIPPED | OUTPATIENT
Start: 2018-09-14 | End: 2019-01-01

## 2018-09-14 NOTE — TELEPHONE ENCOUNTER
Future appt:    Last Appointment:  8/15/2018  Cholesterol, Total (mg/dL)   Date Value   10/09/2017 151     HDL Cholesterol (mg/dL)   Date Value   10/09/2017 48     LDL Cholesterol (mg/dL)   Date Value   10/09/2017 74     Triglycerides (mg/dL)   Date Value

## 2018-09-24 ENCOUNTER — TELEPHONE (OUTPATIENT)
Dept: FAMILY MEDICINE CLINIC | Facility: CLINIC | Age: 83
End: 2018-09-24

## 2018-09-24 NOTE — TELEPHONE ENCOUNTER
wants to know why [facility] has a rx for her from Dr Ángela Amaya- has not been seen for a while- rx was for a blood thinner due to leg clots - pt states she does not have clots in leg

## 2018-09-24 NOTE — TELEPHONE ENCOUNTER
Pt states she does not take this med citostazol 100. I informed pt to call the pharmacy since this was a refill request from them. Pt will not  med and call the pharmacy to d/c med.

## 2018-10-12 RX ORDER — NIFEDIPINE 60 MG/1
TABLET, EXTENDED RELEASE ORAL
Qty: 90 TABLET | Refills: 0 | Status: SHIPPED | OUTPATIENT
Start: 2018-10-12 | End: 2018-12-26

## 2018-10-17 RX ORDER — NIFEDIPINE 60 MG/1
TABLET, EXTENDED RELEASE ORAL
Qty: 90 TABLET | Refills: 2 | OUTPATIENT
Start: 2018-10-17

## 2018-10-31 RX ORDER — SERTRALINE HYDROCHLORIDE 100 MG/1
TABLET, FILM COATED ORAL
Qty: 45 TABLET | Refills: 0 | Status: SHIPPED | OUTPATIENT
Start: 2018-10-31 | End: 2018-11-03

## 2018-10-31 RX ORDER — ATORVASTATIN CALCIUM 10 MG/1
TABLET, FILM COATED ORAL
Qty: 90 TABLET | Refills: 0 | Status: SHIPPED | OUTPATIENT
Start: 2018-10-31 | End: 2018-11-03

## 2018-11-03 RX ORDER — SERTRALINE HYDROCHLORIDE 100 MG/1
TABLET, FILM COATED ORAL
Qty: 45 TABLET | Refills: 0 | Status: SHIPPED | OUTPATIENT
Start: 2018-11-03 | End: 2019-01-01

## 2018-11-03 RX ORDER — ATORVASTATIN CALCIUM 10 MG/1
TABLET, FILM COATED ORAL
Qty: 90 TABLET | Refills: 0 | Status: SHIPPED | OUTPATIENT
Start: 2018-11-03 | End: 2019-02-02

## 2018-12-26 RX ORDER — NIFEDIPINE 60 MG/1
TABLET, EXTENDED RELEASE ORAL
Qty: 90 TABLET | Refills: 0 | Status: SHIPPED | OUTPATIENT
Start: 2018-12-26 | End: 2019-01-01

## 2018-12-26 RX ORDER — LORAZEPAM 1 MG/1
TABLET ORAL
Qty: 90 TABLET | Refills: 0 | Status: SHIPPED | OUTPATIENT
Start: 2018-12-26 | End: 2019-02-02

## 2018-12-26 NOTE — TELEPHONE ENCOUNTER
Please advise refill of Lorazepam 1mg and Nifedipine 60mg.     Last Rx: 8/10/18 Lorazepam and Nifedipine 10/12/18    Future appt:    Last Appointment:  8/15/2018 no f/u noted        Cholesterol, Total (mg/dL)   Date Value   10/09/2017 151     HDL Cholestero

## 2018-12-26 NOTE — TELEPHONE ENCOUNTER
Future appt:    Last Appointment:  8/15/2018 Dr Bessie Suresh  Cholesterol, Total (mg/dL)   Date Value   10/09/2017 151     HDL Cholesterol (mg/dL)   Date Value   10/09/2017 48     LDL Cholesterol (mg/dL)   Date Value   10/09/2017 74     Triglycerides (mg/dL)

## 2018-12-28 RX ORDER — ALBUTEROL SULFATE 90 UG/1
2 AEROSOL, METERED RESPIRATORY (INHALATION) 4 TIMES DAILY PRN
Qty: 1 INHALER | Refills: 1 | Status: SHIPPED | OUTPATIENT
Start: 2018-12-28

## 2019-01-01 ENCOUNTER — APPOINTMENT (OUTPATIENT)
Dept: LAB | Age: 84
End: 2019-01-01
Attending: FAMILY MEDICINE
Payer: MEDICARE

## 2019-01-01 ENCOUNTER — OFFICE VISIT (OUTPATIENT)
Dept: FAMILY MEDICINE CLINIC | Facility: CLINIC | Age: 84
End: 2019-01-01
Payer: MEDICARE

## 2019-01-01 ENCOUNTER — TELEPHONE (OUTPATIENT)
Dept: FAMILY MEDICINE CLINIC | Facility: CLINIC | Age: 84
End: 2019-01-01

## 2019-01-01 VITALS
WEIGHT: 132.38 LBS | RESPIRATION RATE: 16 BRPM | TEMPERATURE: 97 F | OXYGEN SATURATION: 93 % | SYSTOLIC BLOOD PRESSURE: 104 MMHG | DIASTOLIC BLOOD PRESSURE: 56 MMHG | HEIGHT: 63 IN | HEART RATE: 60 BPM | BODY MASS INDEX: 23.46 KG/M2

## 2019-01-01 VITALS
WEIGHT: 130 LBS | HEART RATE: 68 BPM | RESPIRATION RATE: 12 BRPM | BODY MASS INDEX: 23.04 KG/M2 | HEIGHT: 63 IN | OXYGEN SATURATION: 93 % | SYSTOLIC BLOOD PRESSURE: 126 MMHG | DIASTOLIC BLOOD PRESSURE: 64 MMHG | TEMPERATURE: 97 F

## 2019-01-01 VITALS
SYSTOLIC BLOOD PRESSURE: 120 MMHG | HEART RATE: 58 BPM | RESPIRATION RATE: 16 BRPM | WEIGHT: 131.63 LBS | BODY MASS INDEX: 23.32 KG/M2 | DIASTOLIC BLOOD PRESSURE: 70 MMHG | TEMPERATURE: 98 F | HEIGHT: 63 IN | OXYGEN SATURATION: 95 %

## 2019-01-01 DIAGNOSIS — I73.9 PERIPHERAL ARTERIAL DISEASE (HCC): ICD-10-CM

## 2019-01-01 DIAGNOSIS — I10 BENIGN HYPERTENSION: ICD-10-CM

## 2019-01-01 DIAGNOSIS — E03.9 HYPOTHYROIDISM, UNSPECIFIED TYPE: ICD-10-CM

## 2019-01-01 DIAGNOSIS — I10 ESSENTIAL HYPERTENSION, BENIGN: Primary | ICD-10-CM

## 2019-01-01 DIAGNOSIS — Z00.00 ENCOUNTER FOR ANNUAL HEALTH EXAMINATION: Primary | ICD-10-CM

## 2019-01-01 DIAGNOSIS — B02.9 HERPES ZOSTER WITHOUT COMPLICATION: ICD-10-CM

## 2019-01-01 DIAGNOSIS — I10 BENIGN HYPERTENSION: Primary | ICD-10-CM

## 2019-01-01 PROCEDURE — 36415 COLL VENOUS BLD VENIPUNCTURE: CPT | Performed by: FAMILY MEDICINE

## 2019-01-01 PROCEDURE — 99214 OFFICE O/P EST MOD 30 MIN: CPT | Performed by: FAMILY MEDICINE

## 2019-01-01 PROCEDURE — 99213 OFFICE O/P EST LOW 20 MIN: CPT | Performed by: FAMILY MEDICINE

## 2019-01-01 PROCEDURE — 80053 COMPREHEN METABOLIC PANEL: CPT | Performed by: FAMILY MEDICINE

## 2019-01-01 PROCEDURE — 80061 LIPID PANEL: CPT | Performed by: FAMILY MEDICINE

## 2019-01-01 PROCEDURE — G0439 PPPS, SUBSEQ VISIT: HCPCS | Performed by: FAMILY MEDICINE

## 2019-01-01 PROCEDURE — 84443 ASSAY THYROID STIM HORMONE: CPT | Performed by: FAMILY MEDICINE

## 2019-01-01 RX ORDER — ATORVASTATIN CALCIUM 10 MG/1
TABLET, FILM COATED ORAL
Qty: 90 TABLET | Refills: 0 | Status: SHIPPED | OUTPATIENT
Start: 2019-01-01 | End: 2019-01-01

## 2019-01-01 RX ORDER — SERTRALINE HYDROCHLORIDE 100 MG/1
TABLET, FILM COATED ORAL
Qty: 45 TABLET | Refills: 0 | Status: SHIPPED | OUTPATIENT
Start: 2019-01-01 | End: 2019-01-01

## 2019-01-01 RX ORDER — ASPIRIN 81 MG/1
81 TABLET ORAL DAILY
COMMUNITY

## 2019-01-01 RX ORDER — LORAZEPAM 1 MG/1
TABLET ORAL
Qty: 90 TABLET | Refills: 1 | Status: SHIPPED
Start: 2019-01-01 | End: 2019-01-01

## 2019-01-01 RX ORDER — LORAZEPAM 1 MG/1
TABLET ORAL
Qty: 90 TABLET | Refills: 1 | Status: SHIPPED | OUTPATIENT
Start: 2019-01-01 | End: 2019-01-01

## 2019-01-01 RX ORDER — ATENOLOL 50 MG/1
50 TABLET ORAL 2 TIMES DAILY
Qty: 180 TABLET | Refills: 3 | Status: SHIPPED | OUTPATIENT
Start: 2019-01-01

## 2019-01-01 RX ORDER — ATENOLOL 50 MG/1
50 TABLET ORAL 2 TIMES DAILY
Qty: 180 TABLET | Refills: 3 | Status: SHIPPED | OUTPATIENT
Start: 2019-01-01 | End: 2019-01-01

## 2019-01-01 RX ORDER — ATENOLOL 50 MG/1
50 TABLET ORAL 2 TIMES DAILY
COMMUNITY
End: 2019-01-01

## 2019-01-01 RX ORDER — NIFEDIPINE 60 MG/1
60 TABLET, EXTENDED RELEASE ORAL
Qty: 90 TABLET | Refills: 3 | Status: SHIPPED | OUTPATIENT
Start: 2019-01-01

## 2019-01-01 RX ORDER — SERTRALINE HYDROCHLORIDE 100 MG/1
TABLET, FILM COATED ORAL
Qty: 45 TABLET | Refills: 3 | Status: SHIPPED | OUTPATIENT
Start: 2019-01-01

## 2019-01-01 RX ORDER — CILOSTAZOL 100 MG/1
100 TABLET ORAL 2 TIMES DAILY
Qty: 180 TABLET | Refills: 1 | Status: SHIPPED | OUTPATIENT
Start: 2019-01-01 | End: 2019-01-01

## 2019-01-01 RX ORDER — GABAPENTIN 300 MG/1
300 CAPSULE ORAL 3 TIMES DAILY
Qty: 90 CAPSULE | Refills: 2 | Status: SHIPPED | OUTPATIENT
Start: 2019-01-01 | End: 2020-03-13

## 2019-01-01 RX ORDER — LEVOTHYROXINE SODIUM 88 UG/1
TABLET ORAL
Qty: 90 TABLET | Refills: 1 | Status: SHIPPED | OUTPATIENT
Start: 2019-01-01 | End: 2020-01-01

## 2019-01-01 RX ORDER — LORAZEPAM 1 MG/1
TABLET ORAL
Qty: 90 TABLET | Refills: 0 | Status: SHIPPED | OUTPATIENT
Start: 2019-01-01 | End: 2019-01-01

## 2019-01-01 RX ORDER — ATORVASTATIN CALCIUM 10 MG/1
TABLET, FILM COATED ORAL
Qty: 90 TABLET | Refills: 3 | Status: SHIPPED | OUTPATIENT
Start: 2019-01-01

## 2019-01-01 RX ORDER — CILOSTAZOL 100 MG/1
TABLET ORAL
Qty: 180 TABLET | Refills: 1 | Status: SHIPPED | OUTPATIENT
Start: 2019-01-01 | End: 2020-03-20

## 2019-01-01 RX ORDER — LORAZEPAM 1 MG/1
TABLET ORAL
Qty: 90 TABLET | Refills: 1 | Status: SHIPPED | OUTPATIENT
Start: 2019-01-01 | End: 2020-01-01

## 2019-01-01 RX ORDER — NIFEDIPINE 60 MG/1
60 TABLET, EXTENDED RELEASE ORAL
Qty: 90 TABLET | Refills: 0 | Status: SHIPPED | OUTPATIENT
Start: 2019-01-01 | End: 2019-01-01

## 2019-01-01 RX ORDER — LORAZEPAM 1 MG/1
TABLET ORAL
Qty: 90 TABLET | Refills: 0 | Status: SHIPPED
Start: 2019-01-01 | End: 2019-01-01

## 2019-01-24 NOTE — TELEPHONE ENCOUNTER
Future appt:    Last Appointment: 8/15/18 with Dr. Boy Carpio for multiple health issues including thyroid  Last TSH 8/15/18    Cholesterol, Total (mg/dL)   Date Value   10/09/2017 151     HDL Cholesterol (mg/dL)   Date Value   10/09/2017 48     LDL Choleste

## 2019-01-25 RX ORDER — LEVOTHYROXINE SODIUM 88 UG/1
TABLET ORAL
Qty: 90 TABLET | Refills: 1 | Status: SHIPPED | OUTPATIENT
Start: 2019-01-25 | End: 2019-01-01

## 2019-02-02 ENCOUNTER — TELEPHONE (OUTPATIENT)
Dept: FAMILY MEDICINE CLINIC | Facility: CLINIC | Age: 84
End: 2019-02-02

## 2019-02-02 RX ORDER — ATORVASTATIN CALCIUM 10 MG/1
TABLET, FILM COATED ORAL
Qty: 90 TABLET | Refills: 0 | Status: SHIPPED | OUTPATIENT
Start: 2019-02-02 | End: 2019-01-01

## 2019-02-02 RX ORDER — LORAZEPAM 1 MG/1
TABLET ORAL
Qty: 90 TABLET | Refills: 0 | Status: SHIPPED
Start: 2019-02-02 | End: 2019-01-01

## 2019-02-02 NOTE — TELEPHONE ENCOUNTER
Please advise refills of lorazepam and atorvastatin. Future appt:     Your appointments     Date & Time Appointment Department [facility])    Feb 19, 2019 10:30 AM CST Exam - Established Patient with Nicky Hastings MD [address] [place] [address],

## 2019-02-02 NOTE — TELEPHONE ENCOUNTER
Left detailed message informing patient lorazepam prescription was faxed and atorvastatin prescription went over e-scribe. Stated she can call the office back on Monday if she has any questions.

## 2019-02-02 NOTE — TELEPHONE ENCOUNTER
Patient called back stating she is completely out of lorazepam and last time she went without it she ended up in the ER. Please advise.

## 2019-02-02 NOTE — TELEPHONE ENCOUNTER
pt is former dr Anthony Naranjo pt and has appt  on 2/19 for med check and refill but is out of meds now- wants to know if she can have a refill before she sees dr- wants to talk to nurse to discuss which meds she needs

## 2019-02-19 PROBLEM — N39.0 CHRONIC UTI: Status: ACTIVE | Noted: 2017-11-03

## 2019-02-19 PROBLEM — F17.200 TOBACCO DEPENDENCE: Status: ACTIVE | Noted: 2017-11-02

## 2019-02-19 PROBLEM — Z96.651 STATUS POST TOTAL RIGHT KNEE REPLACEMENT: Status: ACTIVE | Noted: 2017-01-26

## 2019-02-19 PROBLEM — N32.81 OAB (OVERACTIVE BLADDER): Status: ACTIVE | Noted: 2017-11-03

## 2019-02-19 PROBLEM — I25.10 ATHEROSCLEROSIS OF NATIVE CORONARY ARTERY OF NATIVE HEART WITHOUT ANGINA PECTORIS: Status: ACTIVE | Noted: 2017-11-02

## 2019-02-19 NOTE — PROGRESS NOTES
Chief Complaint:   Patient presents with:  Medication Follow-Up      HPI:   This is a 80year old female coming in for feeling well   Here to get established. Reviewed labs and medicaitons.          Results for orders placed or performed in visit on 08/ Monocyte % 9.3 %    Eosinophil % 2.4 %    Basophil % 0.6 %    Immature Granulocyte % 0.3 %       HISTORY:  Past Medical History:   Diagnosis Date   • CAD (coronary artery disease)     Mild with angiogram in 2011   • Chronic anxiety    • Chronic UTI GERD, Depression, Chronic Anxiety  Chronic Smoker  Chronic UTI and bladder dysfunction - see urologist  Surgical History (reviewed - no changes required): AIDEN & BSO (at age 40--cervix present); breast bx  appendectomy  partial thyroidectomy  Partial colect Oxybutynin Chloride 5 MG Oral Tab Take 5 mg by mouth daily.    Disp:  Rfl:         Allergies:    Ciprofloxacin               Comment:Other reaction(s): arm stiffness  Codeine                     Comment:Other reaction(s): rash  Levofloxacin discharge   Throat:  No tonsillar erythema or exudate. Mouth:  No oral lesions or ulcerations, good dentition. NECK: Supple,  no JVD, no thyromegaly. SKIN: No rashes, no skin lesion, no bruising, good turgor.     HEART:  Regular rate and rhythm, no m MD  2/19/2019  10:37 AM

## 2019-02-19 NOTE — PATIENT INSTRUCTIONS
Continue with current medications. Ok for refills as needed.      Recheck with physical after June 8

## 2019-03-01 NOTE — TELEPHONE ENCOUNTER
Future appt:     Your appointments     Date & Time Appointment Department [facility])    Jun 21, 2019  1:30 PM CDT Medicare Annual Well Visit with Faizan Murphy MD 25 [facility], [place] Yarb[place] ([facility])        Andriy Britton

## 2019-03-20 NOTE — TELEPHONE ENCOUNTER
Future appt:     Your appointments     Date & Time Appointment Department [facility])    Jun 21, 2019  1:30 PM CDT Medicare Annual Well Visit with Stefania Willis MD [address] [place] [address] ([facility])        Ronnie Zheng

## 2019-03-29 NOTE — TELEPHONE ENCOUNTER
Future appt:     Your appointments     Date & Time Appointment Department [facility])    Jun 21, 2019  1:30 PM CDT Medicare Annual Well Visit with Regina Duke MD 25 [facility], [facility] ([place])        Zachariah Cuello

## 2019-04-26 NOTE — TELEPHONE ENCOUNTER
Future appt:     Your appointments     Date & Time Appointment Department [facility])    Jun 21, 2019  1:30 PM CDT Medicare Annual Well Visit with Fadi Cxo MD 25 [facility] ([facility])            25 [facility], [address] [place] [address] Group [place] [address] [id number] [phone]        Last Appointment:  2/19/2019 HTN check  Cholesterol, Total (mg/dL)   Date Value   10/09/2017 151     HDL Cholesterol (mg/dL)   Date Value   10/09/2017 48     LDL Cholesterol (mg/dL)   Date Value   10/09/2017 74     Triglycerides (mg/dL)   Date Value   10/09/2017 147     No results found for: EAG, A1C  Lab Results   Component Value Date    T4F 1.2 08/15/2018    TSH 6.640 (H) 08/15/2018       Last RF: 3/29/19

## 2019-04-30 NOTE — TELEPHONE ENCOUNTER
Future appt:     Your appointments     Date & Time Appointment Department [facility])    Jun 21, 2019  1:30 PM CDT Medicare Annual Well Visit with Jesus Penn MD [address] [place] [address], [facility])            Daniella Manzo

## 2019-05-20 NOTE — TELEPHONE ENCOUNTER
Future appt:     Your appointments     Date & Time Appointment Department [facility])    Jun 21, 2019  1:30 PM CDT Medicare Annual Well Visit with Rhonda Moore MD [address] [place] [address], [facility]            Tamar Leigh

## 2019-05-23 NOTE — TELEPHONE ENCOUNTER
Future appt:     Your appointments     Date & Time Appointment Department [facility])    Jun 21, 2019  1:30 PM CDT Medicare Annual Well Visit with Adrianna Hester MD [address] [place] [address] ([facility]            Murali Browne

## 2019-06-21 NOTE — PROGRESS NOTES
Chief Complaint:   Patient presents with: Well Adult      HPI:   This is a 80year old female coming in for healthcare check. Patient prior physical with Dr. Lockhart Fairly last year. Patient was seen by myself in February for blood pressure check.   Patient st mIU/mL   FREE T4 (FREE THYROXINE)   Result Value Ref Range    Free T4 1.2 0.9 - 1.8 ng/dL   CBC W/ DIFFERENTIAL   Result Value Ref Range    WBC 6.8 4.0 - 13.0 x10(3) uL    RBC 5.11 (H) 3.80 - 5.10 x10(6)uL    HGB 14.7 12.0 - 16.0 g/dL    HCT 44.6 34.0 - 50 • Other (Parkinson's) Mother          at 80 Parkinson's   • Breast Cancer Sister             Social History    Socioeconomic History      Marital status:        Spouse name: Not on file      Number of children: Not on file      Years of educati daily. Disp: 180 tablet Rfl: 3   NIFEdipine ER osmotic release 60 MG Oral Tablet 24 Hr Take 1 tablet (60 mg total) by mouth once daily.  Disp: 90 tablet Rfl: 3   Sertraline HCl 100 MG Oral Tab TAKE 1/2 TABLET BY MOUTH EVERY DAY Disp: 45 tablet Rfl: 3   ator discharge.     MUSCULOSKELETAL: see HPI    Denies weakness, muscle aches,     NEUROLOGICAL:  Denies headache, dizziness,   HEMATOLOGIC:  Denies bleeding or bruising. PSYCHIATRIC:  Denies depression or anxiety.   ENDOCRINOLOGIC:  Denies cold or heat intol hypertension  Continue with current regimen.  - COMP METABOLIC PANEL (14); Future  - LIPID PANEL; Future  - COMP METABOLIC PANEL (14)  - LIPID PANEL    3. Hypothyroidism, unspecified type  Obtain laboratory testing will adjust medication as needed.   - TSH osteoarthritis of right knee     Status post total right knee replacement     Tobacco dependence      Manolo Neal MD  6/21/2019  1:32 PM

## 2019-06-22 NOTE — TELEPHONE ENCOUNTER
----- Message from Issa Fowler MD sent at 6/22/2019  8:41 AM CDT -----  Labs reviewed     Thyroid testing improved. Continue with current dosing     Cholesterol testing good. Recheck of labs in 1 year. Ok for refills as needed.

## 2019-07-22 NOTE — TELEPHONE ENCOUNTER
Future appt: Your appointments     Date & Time Appointment Department [facility])    Dec 20, 2019  1:30 PM CST Follow up with Keyana Ulrich MD [address] [facility] ([facility])            [address] [facility]  [place] [address] [id number] [phone]        Last Appointment with Provider:  6/21/2019    Last Appointment at [facility]:  6/21/2019    Lab Results   Component Value Date     06/21/2019    K 4.4 06/21/2019    BUN 18 06/21/2019    CREATSERUM 0.78 06/21/2019    AST 14 (L) 06/21/2019    ALT 15 06/21/2019     Lab Results   Component Value Date    HGB 14.7 08/15/2018    HCT 44.6 08/15/2018    .0 08/15/2018     No results found for: EAG, A1C  Lab Results   Component Value Date    T4F 1.2 08/15/2018    TSH 1.590 06/21/2019     Lab Results   Component Value Date    CHOLEST 155 06/21/2019    HDL 43 06/21/2019    LDL 57 06/21/2019    TRIG 276 (H) 06/21/2019       No follow-ups on file.

## 2019-09-18 NOTE — TELEPHONE ENCOUNTER
Future appt:     Your appointments     Date & Time Appointment Department [facility])    Dec 20, 2019  1:30 PM CST Follow up with MD Tommy Caldwell 26, 26 Yobany Chandra ([facility])            Tommy 26,

## 2019-09-20 NOTE — TELEPHONE ENCOUNTER
Future appt:     Your appointments     Date & Time Appointment Department [facility])    Dec 20, 2019  1:30 PM CST Follow up with MD Tommy Valdovinos 26, 26 Ira Pickering, [facility] ([place])            Tommy 26,

## 2019-11-11 NOTE — TELEPHONE ENCOUNTER
Future appt:     Your appointments     Date & Time Appointment Department [facility])    Dec 20, 2019  1:30 PM CST Follow up with MD Tommy Tate 26, 26 Kranthi Chandra ([facility])            Tommy 26,

## 2019-11-11 NOTE — TELEPHONE ENCOUNTER
Brien Mendoza from [address] [place] [address] called again, wants update on patients refill (Lorazepam 1mg) Would like a call back.

## 2019-12-20 NOTE — PROGRESS NOTES
Chief Complaint:   Patient presents with:  Blood Pressure: f/u      HPI:   This is a 80year old female coming in for follow-up care. See prior note.   Patient seen in June with chronic illnesses of hypertension, chronic chronic anxiety along with arthriti disease)    • History of colonoscopy 04/2018    5 year repeat   • Hyperlipidemia    • Hypertension    • Hypothyroidism    • PVD (peripheral vascular disease) ([facility] [address]     No stent, 2011 treated with medication   • Retinal detachment 2016   • Smoker    • Thyro Father  of colon cancer at the age of 80. Mother   of parkinson's at the age of 80.  The patient indicates family history of colon cancer (father), breast cancer (sister), hypertension (father), hyperlipidemia (father).              Social History ( Comment:Other reaction(s): rash  Levofloxacin                Comment:Other reaction(s): possibly caused severe vomiting  Lotrel                    Phenazopyridine           Sulfa Antibiotics              REVIEW OF SYSTEMS:   CONSTITUTIONAL:  Denies , fever good turgor.     HEART:  Regular rate and rhythm, no murmurs,   LUNGS: Clear to auscultation bilterally, no rales/rhonchi/wheezing.     ABDOMEN:  Soft, nondistended, nontender, bowel sounds normal in all 4 quadrants, no masses, no hepatosplenomegaly.   BACK pectoris     Chronic UTI     OAB (overactive bladder)     Primary osteoarthritis of right knee     Status post total right knee replacement     Tobacco dependence      Grace Mueller MD  12/20/2019  1:38 PM

## 2019-12-20 NOTE — PATIENT INSTRUCTIONS
Good exam       Ok to restart of gabapentin for shingles discomfort       Continue with blood pressure medications - refill for atenolol sent in       Recheck in 6 months for physical

## 2019-12-27 NOTE — TELEPHONE ENCOUNTER
Future Appointments   Date Time Provider Salvatore Rika   6/23/2020  1:30 PM Samuel Real MD [facility]

## 2020-01-01 RX ORDER — LORAZEPAM 1 MG/1
TABLET ORAL
Qty: 90 TABLET | Refills: 0 | Status: SHIPPED | OUTPATIENT
Start: 2020-01-01

## 2020-01-01 RX ORDER — LEVOTHYROXINE SODIUM 88 UG/1
TABLET ORAL
Qty: 90 TABLET | Refills: 1 | Status: SHIPPED | OUTPATIENT
Start: 2020-01-01

## 2020-01-08 NOTE — TELEPHONE ENCOUNTER
Future appt:     Your appointments     Date & Time Appointment Department [facility])    Jun 23, 2020  1:30 PM CDT Medicare Annual Well Visit with Benito Vergara MD [address] [place] [address]

## 2020-01-09 NOTE — TELEPHONE ENCOUNTER
Future appt:     Your appointments     Date & Time Appointment Department [facility])    Jun 23, 2020  1:30 PM CDT Medicare Annual Well Visit with Buddy Lan MD [facility] ([facility])            [facility]

## 2020-03-13 RX ORDER — GABAPENTIN 300 MG/1
CAPSULE ORAL
Qty: 270 CAPSULE | Refills: 3 | Status: SHIPPED | OUTPATIENT
Start: 2020-03-13

## 2020-03-13 NOTE — TELEPHONE ENCOUNTER
Future appt: Your appointments     Date & Time Appointment Department [facility])    Jun 23, 2020  1:30 PM CDT Medicare Annual Well Visit with Reese Camara MD [address] [facility] ([place])            [address] [facility] [address]  [place] [address] [id number] [phone]        Last Appointment with provider:   12/20/2019  Last appointment at [facility]:  12/20/2019  Cholesterol, Total (mg/dL)   Date Value   06/21/2019 155     HDL Cholesterol (mg/dL)   Date Value   06/21/2019 43     LDL Cholesterol (mg/dL)   Date Value   06/21/2019 57     Triglycerides (mg/dL)   Date Value   06/21/2019 276 (H)     No results found for: EAG, A1C  Lab Results   Component Value Date    T4F 1.2 08/15/2018    TSH 1.590 06/21/2019       No follow-ups on file.   Recheck in 6 months for physical

## 2020-03-19 NOTE — TELEPHONE ENCOUNTER
Future appt:     Your appointments     Date & Time Appointment Department [facility])    Jun 23, 2020  1:30 PM CDT Medicare Annual Well Visit with Ck Irwin MD [address] [facility] [place] ([facility])            Sophy Sims

## 2020-03-20 RX ORDER — CILOSTAZOL 100 MG/1
TABLET ORAL
Qty: 180 TABLET | Refills: 1 | Status: SHIPPED | OUTPATIENT
Start: 2020-03-20

## 2020-04-20 NOTE — TELEPHONE ENCOUNTER
Future appt:     Your appointments     Date & Time Appointment Department [facility])    Jun 21, 2019  1:30 PM CDT Medicare Annual Well Visit with Danette Willingham MD [address] [place] [address] ([facility])        Jerry Pedroza pt to remain NPO and progress to po meals as medically feasible

## 2020-06-29 ENCOUNTER — TELEPHONE (OUTPATIENT)
Dept: FAMILY MEDICINE CLINIC | Facility: CLINIC | Age: 85
End: 2020-06-29

## 2020-07-03 NOTE — TELEPHONE ENCOUNTER
Chart reviewed     [id number] Tanisha Hwang for refills July 6, 2020        Leti Grover  [address] [address] [place] [address] [address]  [place] [address]    This letter provides a written record that you were tested for COVID-19 on 07/03/20.       Your result was negative. This means that we didn t find the virus that causes COVID-19 in your sample. A test may show negative when you do actually have the virus. This can happen when the virus is in the early stages of infection, before you feel illness symptoms.    If you have symptoms   Stay home and away from others (self-isolate) until you meet ALL of the guidelines below:    You ve had no fever--and no medicine that reduces fever--for 3 full days (72 hours). And      Your other symptoms have gotten better. For example, your cough or breathing has improved. And     At least 10 days have passed since your symptoms started.    During this time:    Stay home. Don t go to work, school or anywhere else.     Stay in your own room, including for meals. Use your own bathroom if you can.    Stay away from others in your home. No hugging, kissing or shaking hands. No visitors.    Clean  high touch  surfaces often (doorknobs, counters, handles, etc.). Use a household cleaning spray or wipes. You can find a full list on the EPA website at www.epa.gov/pesticide-registration/list-n-disinfectants-use-against-sars-cov-2.    Cover your mouth and nose with a mask, tissue or washcloth to avoid spreading germs.    Wash your hands and face often with soap and water.    Going back to work  Check with your employer for any guidelines to follow for going back to work.    Employers: This document serves as formal notice that your employee tested negative for COVID-19, as of the testing date shown above.

## 2021-06-16 NOTE — TELEPHONE ENCOUNTER
Future appt:     Your appointments     Date & Time Appointment Department [facility])    Jun 08, 2018 11:00 AM CDT Medicare Annual Well Visit with Luis Alberto Núñez MD [address] [place] [address] ([facility])        Evangelina Godwin Pt states has video visit scheduled tomorrow and unsure if she needs this appt? No further details, would like to speak with CSS

## (undated) NOTE — MR AVS SNAPSHOT
Tommy [address] [place]  [place] [address] [id number]  [phone]               Thank you for choosing us for your health care visit with Diego Higgins MD.  We are glad to serve you and happy to provide you with this summary of 148/74 mmHg 78 98 °F (36.7 °C) (Tympanic) 63\" 138 lb 24.45 kg/m2         Current Medications          This list is accurate as of: 6/7/17  7:55 PM.  Always use your most recent med list.                atenolol 50 MG Tabs   1 tablet 2 (two) times daily. your Zip Code and Date of Birth to complete the sign-up process. If you do not sign up before the expiration date, you must request a new code.     Your unique Kid[employer] Access Code: [email or web address]  Expires: 8/6/2017  7:55 PM    If you have questions, you can ca ? Use a cane or walker (indoors and out) if you are unsteady on your feet.              Visit [facility] online at  [email or web address]